# Patient Record
Sex: FEMALE | Employment: FULL TIME | URBAN - METROPOLITAN AREA
[De-identification: names, ages, dates, MRNs, and addresses within clinical notes are randomized per-mention and may not be internally consistent; named-entity substitution may affect disease eponyms.]

---
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
## 2024-03-04 NOTE — PROGRESS NOTES
SUBJECTIVE:                                                   Denice Blanchard is a 18 year old who presents to clinic today for the following health issue(s):  Patient presents with:  Breast Problem      HPI:  Denice reports that she began experiencing breast pain about three years ago and it has occurred intermittently since then. She feels an aching on the outer side of her left breast and has mildly felt it on the right breast on occasion. Denies noticing any lumps, breast changes, or nipple discharge. She is unsure if pain is related to menstrual cycle, but notes that wearing a supportive bra helps. Reports that pain has recently increased in intensity.     She is requesting gc/ct urine screen today.    Patient's last menstrual period was 02/19/2024 (exact date).  Menstrual History: frequency: every 28 days, but last cycle was very heavy and lasted 3 days     Patient is sexually active  No obstetric history on file..  Using oral contraceptives for contraception.   Health maintenance updated:  yes  STI infx testing offered:  Accepted      Problem list and histories reviewed & adjusted, as indicated.  Additional history: as documented.    Patient Active Problem List   Diagnosis    Flexural eczema    Bilateral fibrocystic breast changes     Past Surgical History:   Procedure Laterality Date    NO PAST SURGERIES        Social History     Tobacco Use    Smoking status: Unknown     Passive exposure: Never    Smokeless tobacco: Never   Substance Use Topics    Alcohol use: Yes      Problem (# of Occurrences) Relation (Name,Age of Onset)    Unknown/Adopted (2) Mother, Father              Current Outpatient Medications   Medication Sig    cetirizine (ZYRTEC) 10 MG tablet TAKE 1 TABLET (10 MG) BY MOUTH DAILY (FOR ITCH/HIVES)    desogestrel-ethinyl estradiol (APRI) 0.15-30 MG-MCG tablet Take 1 tablet by mouth daily for 180 days    mometasone (ELOCON) 0.1 % external cream Apply topically daily Daytime as needed     "norethindrone-ethinyl estradiol (JUNEL FE 1/20) 1-20 MG-MCG tablet Take 1 tablet by mouth daily     No current facility-administered medications for this visit.     Allergies   Allergen Reactions    Food      Apples, cherries, peaches       ROS:  12 point review of systems negative other than symptoms noted below.    OBJECTIVE:     /70   Ht 1.549 m (5' 1\")   Wt 53.5 kg (118 lb)   LMP 02/19/2024 (Exact Date)   Breastfeeding No   BMI 22.30 kg/m    Body mass index is 22.3 kg/m .    PHYSICAL EXAM:  Constitutional:  Appearance: Well nourished, well developed alert, in no acute distress  Breasts: Symmetric bilaterally.  No puckering.  No skin changes. No masses present on palpation, no breast tenderness No lymphadenopathy present. Fibrocystic breast tissue palpated bilaterally in upper outer quadrants.  Psychiatric:  Mentation appears normal and affect normal/bright.     In-Clinic Test Results:  No results found for this or any previous visit (from the past 24 hour(s)).    ASSESSMENT/PLAN:                                                        ICD-10-CM    1. Routine screening for STI (sexually transmitted infection)  Z11.3 NEISSERIA GONORRHOEA PCR     CHLAMYDIA TRACHOMATIS PCR     NEISSERIA GONORRHOEA PCR     CHLAMYDIA TRACHOMATIS PCR      2. Breast pain  N64.4 Ob/Gyn  Referral     Breast Provider  Referral     US Breast Bilateral Complete 4 Quadrants     MA Diagnostic Digital Bilateral      3. Fibrocystic breast disease (FCBD), unspecified laterality  N60.19 Breast Provider  Referral     US Breast Bilateral Complete 4 Quadrants     MA Diagnostic Digital Bilateral      4. Encounter for birth control pills maintenance  Z30.41 norethindrone-ethinyl estradiol (JUNEL FE 1/20) 1-20 MG-MCG tablet          COUNSELING:  Discussed that breast pain is likely hormonal and related to fibrocystic breast tissue, which is normal. Reviewed that it's possible SARAH is playing a role, could try lower " does estrogen SARAH but cannot guarantee this would help. Rx sent for lower dose estrogen at patient request. Recommended comfort measures such as supportive bra, reducing caffeine intake, and NSAIDs/heat packs.   Referral for breast center placed with imaging orders for reassurance  GC/CT completed today  RTC as needed if problems arise or for routine well-woman care    ERIC George APRN, CNM

## 2024-03-05 ENCOUNTER — OFFICE VISIT (OUTPATIENT)
Dept: MIDWIFE SERVICES | Facility: CLINIC | Age: 19
End: 2024-03-05
Payer: COMMERCIAL

## 2024-03-05 VITALS
WEIGHT: 118 LBS | SYSTOLIC BLOOD PRESSURE: 120 MMHG | DIASTOLIC BLOOD PRESSURE: 70 MMHG | BODY MASS INDEX: 22.28 KG/M2 | HEIGHT: 61 IN

## 2024-03-05 DIAGNOSIS — N64.4 BREAST PAIN: ICD-10-CM

## 2024-03-05 DIAGNOSIS — Z11.3 ROUTINE SCREENING FOR STI (SEXUALLY TRANSMITTED INFECTION): Primary | ICD-10-CM

## 2024-03-05 DIAGNOSIS — N60.19 FIBROCYSTIC BREAST DISEASE (FCBD), UNSPECIFIED LATERALITY: ICD-10-CM

## 2024-03-05 DIAGNOSIS — Z30.41 ENCOUNTER FOR BIRTH CONTROL PILLS MAINTENANCE: ICD-10-CM

## 2024-03-05 PROCEDURE — 87591 N.GONORRHOEAE DNA AMP PROB: CPT | Performed by: ADVANCED PRACTICE MIDWIFE

## 2024-03-05 PROCEDURE — 99204 OFFICE O/P NEW MOD 45 MIN: CPT | Performed by: ADVANCED PRACTICE MIDWIFE

## 2024-03-05 PROCEDURE — 87491 CHLMYD TRACH DNA AMP PROBE: CPT | Performed by: ADVANCED PRACTICE MIDWIFE

## 2024-03-05 RX ORDER — NORETHINDRONE ACETATE AND ETHINYL ESTRADIOL 1MG-20(21)
1 KIT ORAL DAILY
Qty: 90 TABLET | Refills: 3 | Status: SHIPPED | OUTPATIENT
Start: 2024-03-05

## 2024-03-05 ASSESSMENT — ANXIETY QUESTIONNAIRES
6. BECOMING EASILY ANNOYED OR IRRITABLE: MORE THAN HALF THE DAYS
5. BEING SO RESTLESS THAT IT IS HARD TO SIT STILL: MORE THAN HALF THE DAYS
1. FEELING NERVOUS, ANXIOUS, OR ON EDGE: NEARLY EVERY DAY
IF YOU CHECKED OFF ANY PROBLEMS ON THIS QUESTIONNAIRE, HOW DIFFICULT HAVE THESE PROBLEMS MADE IT FOR YOU TO DO YOUR WORK, TAKE CARE OF THINGS AT HOME, OR GET ALONG WITH OTHER PEOPLE: SOMEWHAT DIFFICULT
GAD7 TOTAL SCORE: 18
GAD7 TOTAL SCORE: 18
2. NOT BEING ABLE TO STOP OR CONTROL WORRYING: NEARLY EVERY DAY
7. FEELING AFRAID AS IF SOMETHING AWFUL MIGHT HAPPEN: NEARLY EVERY DAY
3. WORRYING TOO MUCH ABOUT DIFFERENT THINGS: NEARLY EVERY DAY

## 2024-03-05 ASSESSMENT — PATIENT HEALTH QUESTIONNAIRE - PHQ9
SUM OF ALL RESPONSES TO PHQ QUESTIONS 1-9: 0
5. POOR APPETITE OR OVEREATING: MORE THAN HALF THE DAYS

## 2024-03-06 ENCOUNTER — PATIENT OUTREACH (OUTPATIENT)
Dept: ONCOLOGY | Facility: CLINIC | Age: 19
End: 2024-03-06
Payer: COMMERCIAL

## 2024-03-06 LAB
C TRACH DNA SPEC QL NAA+PROBE: NEGATIVE
N GONORRHOEA DNA SPEC QL NAA+PROBE: NEGATIVE

## 2024-03-06 NOTE — PROGRESS NOTES
New Patient Oncology Nurse Navigator Note     Referring provider: Diamante White CNM     Referring Clinic/Organization: [facility]     Referred to (specialty:) Breast Provider Referral      Date Referral Received: March 5, 2024     Evaluation for:    N64.4 (ICD-10-CM) - Breast pain  N60.19 (ICD-10-CM) - Fibrocystic breast disease (FCBD), unspecified laterality    Clinical History (per Nurse review of records provided):    Per ordering provider 3/5/24 note:  Denice reports that she began experiencing breast pain about three years ago and it has occurred intermittently since then. She feels an aching on the outer side of her left breast and has mildly felt it on the right breast on occasion. Denies noticing any lumps, breast changes, or nipple discharge. She is unsure if pain is related to menstrual cycle, but notes that wearing a supportive bra helps. Reports that pain has recently increased in intensity.   Breasts: Symmetric bilaterally.  No puckering.  No skin changes. No masses present on palpation, no breast tenderness No lymphadenopathy present. Fibrocystic breast tissue palpated bilaterally in upper outer quadrants.  Discussed that breast pain is likely hormonal and related to fibrocystic breast tissue, which is normal. Reviewed that it's possible SARAH is playing a role, could try lower does estrogen SARAH but cannot guarantee this would help. Rx sent for lower dose estrogen at patient request. Recommended comfort measures such as supportive bra, reducing caffeine intake, and NSAIDs/heat packs.   Referral for breast center placed with imaging orders for reassurance    3/6 11:22  - Telephoned and left voice message for patient. In generic manner, explained my role and purpose for the call. She will also be contacted by specialty breast schedulers. Encouraged her to move forward with that scheduling. Welcomed calls.     3/19 9:07 - Telephoned and left voice message for patient  requesting call back and purpose for the call. Writer received referral, reviewed for appropriate plan, and referral transferred to New Patient Scheduling for completion.    Records Location: See Bookmarked material

## 2024-05-01 ENCOUNTER — HOSPITAL ENCOUNTER (OUTPATIENT)
Dept: MAMMOGRAPHY | Facility: CLINIC | Age: 19
Discharge: HOME | End: 2024-05-01
Attending: ADVANCED PRACTICE MIDWIFE
Payer: COMMERCIAL

## 2024-05-01 DIAGNOSIS — N60.19 FIBROCYSTIC BREAST DISEASE (FCBD), UNSPECIFIED LATERALITY: ICD-10-CM

## 2024-05-01 DIAGNOSIS — N64.4 BREAST PAIN: ICD-10-CM

## 2024-05-01 PROCEDURE — 76642 ULTRASOUND BREAST LIMITED: CPT | Mod: LT

## 2024-05-05 DIAGNOSIS — Z30.41 ENCOUNTER FOR SURVEILLANCE OF CONTRACEPTIVE PILLS: ICD-10-CM

## 2024-05-07 RX ORDER — DESOGESTREL AND ETHINYL ESTRADIOL 0.15-0.03
1 KIT ORAL DAILY
Qty: 84 TABLET | Refills: 1 | Status: SHIPPED | OUTPATIENT
Start: 2024-05-07

## 2024-07-03 ENCOUNTER — APPOINTMENT (OUTPATIENT)
Dept: MRI IMAGING | Facility: CLINIC | Age: 19
End: 2024-07-03
Attending: EMERGENCY MEDICINE
Payer: COMMERCIAL

## 2024-07-03 ENCOUNTER — HOSPITAL ENCOUNTER (EMERGENCY)
Facility: CLINIC | Age: 19
Discharge: HOME | End: 2024-07-03
Attending: EMERGENCY MEDICINE | Admitting: EMERGENCY MEDICINE
Payer: COMMERCIAL

## 2024-07-03 VITALS
HEART RATE: 70 BPM | OXYGEN SATURATION: 98 % | DIASTOLIC BLOOD PRESSURE: 70 MMHG | RESPIRATION RATE: 19 BRPM | TEMPERATURE: 98.7 F | SYSTOLIC BLOOD PRESSURE: 100 MMHG

## 2024-07-03 DIAGNOSIS — R20.0 LEFT ARM NUMBNESS: ICD-10-CM

## 2024-07-03 DIAGNOSIS — R20.0 LEFT LEG NUMBNESS: ICD-10-CM

## 2024-07-03 LAB
ALBUMIN SERPL BCG-MCNC: 4.6 G/DL (ref 3.5–5.2)
ALP SERPL-CCNC: 56 U/L (ref 40–150)
ALT SERPL W P-5'-P-CCNC: 20 U/L (ref 0–50)
ANION GAP SERPL CALCULATED.3IONS-SCNC: 11 MMOL/L (ref 7–15)
AST SERPL W P-5'-P-CCNC: 18 U/L (ref 0–45)
ATRIAL RATE - MUSE: 72 BPM
BASOPHILS # BLD AUTO: 0 10E3/UL (ref 0–0.2)
BASOPHILS NFR BLD AUTO: 1 %
BILIRUB SERPL-MCNC: 0.2 MG/DL
BUN SERPL-MCNC: 6.6 MG/DL (ref 6–20)
CALCIUM SERPL-MCNC: 9.9 MG/DL (ref 8.6–10)
CHLORIDE SERPL-SCNC: 105 MMOL/L (ref 98–107)
CREAT SERPL-MCNC: 0.72 MG/DL (ref 0.51–0.95)
DEPRECATED HCO3 PLAS-SCNC: 26 MMOL/L (ref 22–29)
DIASTOLIC BLOOD PRESSURE - MUSE: NORMAL MMHG
EGFRCR SERPLBLD CKD-EPI 2021: >90 ML/MIN/1.73M2
EOSINOPHIL # BLD AUTO: 0.1 10E3/UL (ref 0–0.7)
EOSINOPHIL NFR BLD AUTO: 2 %
ERYTHROCYTE [DISTWIDTH] IN BLOOD BY AUTOMATED COUNT: 13 % (ref 10–15)
GLUCOSE SERPL-MCNC: 93 MG/DL (ref 70–99)
HCG SERPL QL: NEGATIVE
HCT VFR BLD AUTO: 42.4 % (ref 35–47)
HGB BLD-MCNC: 13.9 G/DL (ref 11.7–15.7)
HOLD SPECIMEN: NORMAL
HOLD SPECIMEN: NORMAL
IMM GRANULOCYTES # BLD: 0 10E3/UL
IMM GRANULOCYTES NFR BLD: 0 %
INTERPRETATION ECG - MUSE: NORMAL
LYMPHOCYTES # BLD AUTO: 2 10E3/UL (ref 0.8–5.3)
LYMPHOCYTES NFR BLD AUTO: 32 %
MAGNESIUM SERPL-MCNC: 2.2 MG/DL (ref 1.7–2.3)
MCH RBC QN AUTO: 30 PG (ref 26.5–33)
MCHC RBC AUTO-ENTMCNC: 32.8 G/DL (ref 31.5–36.5)
MCV RBC AUTO: 91 FL (ref 78–100)
MONOCYTES # BLD AUTO: 0.3 10E3/UL (ref 0–1.3)
MONOCYTES NFR BLD AUTO: 5 %
NEUTROPHILS # BLD AUTO: 3.7 10E3/UL (ref 1.6–8.3)
NEUTROPHILS NFR BLD AUTO: 60 %
NRBC # BLD AUTO: 0 10E3/UL
NRBC BLD AUTO-RTO: 0 /100
P AXIS - MUSE: 29 DEGREES
PLATELET # BLD AUTO: 414 10E3/UL (ref 150–450)
POTASSIUM SERPL-SCNC: 3.7 MMOL/L (ref 3.4–5.3)
PR INTERVAL - MUSE: 120 MS
PROT SERPL-MCNC: 8 G/DL (ref 6.4–8.3)
QRS DURATION - MUSE: 64 MS
QT - MUSE: 342 MS
QTC - MUSE: 374 MS
R AXIS - MUSE: 64 DEGREES
RBC # BLD AUTO: 4.64 10E6/UL (ref 3.8–5.2)
SODIUM SERPL-SCNC: 142 MMOL/L (ref 135–145)
SYSTOLIC BLOOD PRESSURE - MUSE: NORMAL MMHG
T AXIS - MUSE: 52 DEGREES
TROPONIN T SERPL HS-MCNC: <6 NG/L
VENTRICULAR RATE- MUSE: 72 BPM
WBC # BLD AUTO: 6.2 10E3/UL (ref 4–11)

## 2024-07-03 PROCEDURE — 85025 COMPLETE CBC W/AUTO DIFF WBC: CPT | Performed by: EMERGENCY MEDICINE

## 2024-07-03 PROCEDURE — 84484 ASSAY OF TROPONIN QUANT: CPT | Performed by: EMERGENCY MEDICINE

## 2024-07-03 PROCEDURE — 72156 MRI NECK SPINE W/O & W/DYE: CPT

## 2024-07-03 PROCEDURE — A9585 GADOBUTROL INJECTION: HCPCS | Performed by: EMERGENCY MEDICINE

## 2024-07-03 PROCEDURE — 93005 ELECTROCARDIOGRAM TRACING: CPT

## 2024-07-03 PROCEDURE — 83735 ASSAY OF MAGNESIUM: CPT | Performed by: EMERGENCY MEDICINE

## 2024-07-03 PROCEDURE — 84703 CHORIONIC GONADOTROPIN ASSAY: CPT | Performed by: EMERGENCY MEDICINE

## 2024-07-03 PROCEDURE — 82565 ASSAY OF CREATININE: CPT | Performed by: EMERGENCY MEDICINE

## 2024-07-03 PROCEDURE — 255N000002 HC RX 255 OP 636: Performed by: EMERGENCY MEDICINE

## 2024-07-03 PROCEDURE — 70553 MRI BRAIN STEM W/O & W/DYE: CPT

## 2024-07-03 PROCEDURE — 36415 COLL VENOUS BLD VENIPUNCTURE: CPT | Performed by: EMERGENCY MEDICINE

## 2024-07-03 PROCEDURE — 99285 EMERGENCY DEPT VISIT HI MDM: CPT | Mod: 25

## 2024-07-03 RX ORDER — GADOBUTROL 604.72 MG/ML
5 INJECTION INTRAVENOUS ONCE
Status: COMPLETED | OUTPATIENT
Start: 2024-07-03 | End: 2024-07-03

## 2024-07-03 RX ADMIN — GADOBUTROL 5 ML: 604.72 INJECTION INTRAVENOUS at 14:04

## 2024-07-03 ASSESSMENT — ACTIVITIES OF DAILY LIVING (ADL)
ADLS_ACUITY_SCORE: 35

## 2024-07-03 NOTE — ED PROVIDER NOTES
Emergency Department Note      History of Present Illness     Chief Complaint   Numbness      HPI   Denice Blanchard is a 19 year old female who presents for sudden, sharp mid-chest pain and left-sided numbness and weakness beginning last night. Denice explains her chest pain lasted a few seconds and immediately was followed by numbness and weakness of her left side, beginning at the top of her left arm radiating to left lower leg. She reports these symptoms were not alleviated overnight and today feels increased weakness when moving her extremities. Currently denies chest pain. Patient states she gets headaches often, but denies any recently. Patient reports a bug bite on her left thigh approximately 1 week ago. She denies any difficulties with walking, dropping objects, trouble balancing, dizziness, neck pain, vision changes, slurred speech, facial drooping, fever, chills, or routine medications. She notes recent changes in birth control and now has about 2 periods a month. Occasional smoker.     Independent Historian   None    Review of External Notes   none    Past Medical History     Medical History and Problem List   History reviewed. No pertinent past medical history.    Medications   Zyrtec    Physical Exam     Patient Vitals for the past 24 hrs:   BP Temp Temp src Pulse Resp SpO2   07/03/24 1130 -- 98.7  F (37.1  C) Temporal -- 17 100 %   07/03/24 1128 (!) 121/90 -- -- 70 -- 97 %     Physical Exam  General: alert, lying comfortably on gurney  HENT: mucous membranes moist  CV: regular rate, regular rhythm, no m/r/g.  2+ radial pulses.  Resp: normal effort, clear throughout, no crackles or wheezing  GI: abdomen soft and nontender, no guarding  MSK: no bony tenderness  Skin: appropriately warm and dry  Extremities: no edema, calves non-tender  Neuro:  Speech is fluent, cognition is normal.    Long and short term memory intact      CN's II-XII intact; EOM intact, symmetric grimace.     UE strength: 5/5  triceps, biceps, ; symmetric bilaterally    LE strength:   5/5 DF, PF, HF, KE; symmetric bilaterally.      Negative Pronator drift.    Normal muscle tone.    Light touch is symmetrical bilateral UE's and LE's.    Reflexes are 2+ and symmetrical.       Finger to Nose and heel to shin testing is intact bilaterally.    Psych: normal mood and affect      Diagnostics     Lab Results   Labs Ordered and Resulted from Time of ED Arrival to Time of ED Departure   MAGNESIUM - Normal       Result Value    Magnesium 2.2     TROPONIN T, HIGH SENSITIVITY - Normal    Troponin T, High Sensitivity <6     COMPREHENSIVE METABOLIC PANEL - Normal    Sodium 142      Potassium 3.7      Carbon Dioxide (CO2) 26      Anion Gap 11      Urea Nitrogen 6.6      Creatinine 0.72      GFR Estimate >90      Calcium 9.9      Chloride 105      Glucose 93      Alkaline Phosphatase 56      AST 18      ALT 20      Protein Total 8.0      Albumin 4.6      Bilirubin Total 0.2     CBC WITH PLATELETS AND DIFFERENTIAL    WBC Count 6.2      RBC Count 4.64      Hemoglobin 13.9      Hematocrit 42.4      MCV 91      MCH 30.0      MCHC 32.8      RDW 13.0      Platelet Count 414      % Neutrophils 60      % Lymphocytes 32      % Monocytes 5      % Eosinophils 2      % Basophils 1      % Immature Granulocytes 0      NRBCs per 100 WBC 0      Absolute Neutrophils 3.7      Absolute Lymphocytes 2.0      Absolute Monocytes 0.3      Absolute Eosinophils 0.1      Absolute Basophils 0.0      Absolute Immature Granulocytes 0.0      Absolute NRBCs 0.0     HCG QUALITATIVE PREGNANCY       Imaging   MR Brain w/o & w Contrast    (Results Pending)   MR Cervical Spine w/o & w Contrast    (Results Pending)       EKG   ECG taken at 1159, ECG read at 1245  Normal sinus rhythm. Normal ECG.   No prior EKG available for comparison.  Rate 72 bpm. OH interval 120 ms. QRS duration 6 ms. QT/QTc 342/374 ms. P-R-T axes 29 64 52.    Independent Interpretation   None    ED Course       Medications Administered   Medications - No data to display    Procedures   Procedures     Discussion of Management   None    ED Course   ED Course as of 07/03/24 1248   Wed Jul 03, 2024   1145 I obtained history and examined the patient as noted above.     1219 I rechecked the patient and performed my exam.        Optional/Additional Documentation  None    Medical Decision Making / Diagnosis     CMS Diagnoses: None    MIPS       None    [facility]   Denice Blanchard is a 19 year old female, otherwise healthy, who presents today with symptoms of chest pain which is resolved, and left-sided numbness.  On exam, the patient has normal vitals.  She is well-appearing, and I cannot reproduce symptoms of weakness or numbness on my exam.  I considered multiple possibilities for symptoms, including multiple sclerosis, transverse myelitis, stroke, venous sinus thrombosis, dissection of the carotid arteries, and aortic dissection.  MRI fortunately is negative for signs of stroke.  I do not suspect aortic dissection, given very brief episode of chest pain, normal blood pressures, symmetric pulses, and low risk based on history.  I do not suspect venous sinus thrombosis, given no concurrent headache or focal neurologic findings.  Labs otherwise are unremarkable.  EKG without signs of ischemia, right heart strain, or signs of pericarditis.  Troponin is negative.  I recommend follow-up with PCP if symptoms continue.  Otherwise, return to the ED for new symptoms such as slurred speech, facial drooping, worsening numbness or weakness, recurrent chest pain, or for any other concerns.    Disposition   The patient was discharged.     Diagnosis     ICD-10-CM    1. Left arm numbness  R20.0       2. Left leg numbness  R20.0            Discharge Medications   New Prescriptions    No medications on file         Scribe Disclosure:  I, Marlyn Cabral, am serving as a scribe at 12:00 PM on 7/3/2024 to document services personally performed by  Mildred Martinez MD based on my observations and the provider's statements to me.        Mildred Martinez MD  07/03/24 0422

## 2024-07-03 NOTE — DISCHARGE INSTRUCTIONS
The cause of your symptoms is unclear.  MRI does not show any signs of stroke, bleeding, or mass.  Your labs are also normal.  There is no sign of a heart attack or other dangerous process today.  I recommend following up with primary care doctor if your symptoms continue.  Return to the ED for worsening symptoms, or for other concerns.    Discharge Instructions  Chest Pain    You have been seen today for chest pain or discomfort.  At this time, your provider has found no signs that your chest pain is due to a serious or life-threatening condition, (or you have declined more testing and/or admission to the hospital). However, sometimes there is a serious problem that does not show up right away. Your evaluation today may not be complete and you may need further testing and evaluation.     Generally, every Emergency Department visit should have a follow-up clinic visit with either a primary or a specialty clinic/provider. Please follow-up as instructed by your emergency provider today.  Return to the Emergency Department if:  Your chest pain changes, gets worse, starts to happen more often, or comes with less activity.  You are newly short of breath.  You get very weak or tired.  You pass out or faint.  You have any new symptoms, like fever, cough, numb legs, or you cough up blood.  You have anything else that worries you.    Until you follow-up with your regular provider, please do the following:  Take one aspirin daily unless you have an allergy or are told not to by your provider.  If a stress test appointment has been made, go to the appointment.  If you have questions, contact your regular provider.  Follow-up with your regular provider/clinic as directed; this is very important.    If you were given a prescription for medicine here today, be sure to read all of the information (including the package insert) that comes with your prescription.  This will include important information about the medicine, its side  effects, and any warnings that you need to know about.  The pharmacist who fills the prescription can provide more information and answer questions you may have about the medicine.  If you have questions or concerns that the pharmacist cannot address, please call or return to the Emergency Department.       Remember that you can always come back to the Emergency Department if you are not able to see your regular provider in the amount of time listed above, if you get any new symptoms, or if there is anything that worries you.

## 2024-07-03 NOTE — ED TRIAGE NOTES
Pt presents to ed to be evaluated for numbness.   Pt states that last night while she was sitting she had a sharp chest pain that lasted until this am, but pt states is gone now. Pt states now she has numbness in her left hand and left foot.   Pt reports she is on birth control, denies any recent leg swelling.      Triage Assessment (Adult)       Row Name 07/03/24 1128          Triage Assessment    Airway WDL WDL        Respiratory WDL    Respiratory WDL WDL        Cardiac WDL    Cardiac WDL WDL        Cognitive/Neuro/Behavioral WDL    Cognitive/Neuro/Behavioral WDL WDL

## 2024-10-08 ENCOUNTER — PATIENT OUTREACH (OUTPATIENT)
Dept: CARE COORDINATION | Facility: CLINIC | Age: 19
End: 2024-10-08
Payer: COMMERCIAL

## 2024-10-22 ENCOUNTER — PATIENT OUTREACH (OUTPATIENT)
Dept: CARE COORDINATION | Facility: CLINIC | Age: 19
End: 2024-10-22
Payer: COMMERCIAL

## 2025-02-17 DIAGNOSIS — Z30.41 ENCOUNTER FOR BIRTH CONTROL PILLS MAINTENANCE: ICD-10-CM

## 2025-02-17 RX ORDER — NORETHINDRONE ACETATE AND ETHINYL ESTRADIOL 1MG-20(21)
1 KIT ORAL DAILY
Qty: 28 TABLET | Refills: 0 | Status: SHIPPED | OUTPATIENT
Start: 2025-02-17

## 2025-02-17 NOTE — TELEPHONE ENCOUNTER
Requested Prescriptions   Pending Prescriptions Disp Refills    norethindrone-ethinyl estradiol (JUNEL FE 1/20) 1-20 MG-MCG tablet 90 tablet 3     Sig: Take 1 tablet by mouth daily.       Contraceptives Protocol Passed - 2/17/2025 11:57 AM        Passed - Patient is not a current smoker if age is 35 or older        Passed - Medication is active on med list and the sig matches. RN to manually verify dose and sig if red X/fail.     If the protocol passes (green check), you do not need to verify med dose and sig.    A prescription matches if they are the same clinical intention.    For Example: once daily and every morning are the same.    For all fails (red x), verify dose and sig.    If the refill does match what is on file, the RN can still proceed to approve the refill request.     If they do not match, route to the appropriate provider.             Passed - Recent (12 mo) or future (90 days) visit within the authorizing provider's specialty     The patient must have completed an in-person or virtual visit within the past 12 months or has a future visit scheduled within the next 90 days with the authorizing provider s specialty.  Urgent care and e-visits do not qualify as an office visit for this protocol.          Passed - Medication indicated for associated diagnosis     Medication is associated with one or more of the following diagnoses:  Contraception  Acne  Dysmenorrhea  Menorrhagia  Amenorrhea  PCOS  Premenstrual Dysphoric Disorder  Irregular menses  Endometriosis  Contraceptive counseling  Finding of menstrual bleeding  Education about oral contraception  Uses contraception  Initial prescription of oral contraception  Oral contraception-no problem  Oral contraceptive repeat          Passed - No active pregnancy on record        Passed - No positive pregnancy test in past 12 months           Last Written Prescription Date:  3/5/24  Last Fill Quantity: 90,  # refills: 3   Last office visit: 3/5/2024 ; last  virtual visit: Visit date not found with prescribing provider:  Diamante White   Future Office Visit:      One month sent  Appointment needed for further refills  Sunita Lopez RN on 2/17/2025 at 3:00 PM

## 2025-03-10 DIAGNOSIS — Z30.41 ENCOUNTER FOR BIRTH CONTROL PILLS MAINTENANCE: ICD-10-CM

## 2025-03-10 RX ORDER — NORETHINDRONE ACETATE AND ETHINYL ESTRADIOL 1MG-20(21)
1 KIT ORAL DAILY
Qty: 28 TABLET | Refills: 0 | OUTPATIENT
Start: 2025-03-10

## 2025-03-10 NOTE — TELEPHONE ENCOUNTER
Requested Prescriptions   Pending Prescriptions Disp Refills    norethindrone-ethinyl estradiol (JUNEL FE 1/20) 1-20 MG-MCG tablet 28 tablet 0     Sig: Take 1 tablet by mouth daily.       Contraceptives Protocol Failed - 3/10/2025 10:17 AM        Failed - Recent (12 mo) or future (90 days) visit within the authorizing provider's specialty     The patient must have completed an in-person or virtual visit within the past 12 months or has a future visit scheduled within the next 90 days with the authorizing provider s specialty.  Urgent care and e-visits do not qualify as an office visit for this protocol.          Passed - Patient is not a current smoker if age is 35 or older        Passed - Medication is active on med list and the sig matches. RN to manually verify dose and sig if red X/fail.     If the protocol passes (green check), you do not need to verify med dose and sig.    A prescription matches if they are the same clinical intention.    For Example: once daily and every morning are the same.    The protocol can not identify upper and lower case letters as matching and will fail.     For Example: Take 1 tablet (50 mg) by mouth daily     TAKE 1 TABLET (50 MG) BY MOUTH DAILY    For all fails (red x), verify dose and sig.    If the refill does match what is on file, the RN can still proceed to approve the refill request.       If they do not match, route to the appropriate provider.             Passed - Medication indicated for associated diagnosis     Medication is associated with one or more of the following diagnoses:  Contraception  Acne  Dysmenorrhea  Menorrhagia  Amenorrhea  PCOS  Premenstrual Dysphoric Disorder  Irregular menses  Endometriosis  Contraceptive counseling  Finding of menstrual bleeding  Education about oral contraception  Uses contraception  Initial prescription of oral contraception  Oral contraception-no problem  Oral contraceptive repeat          Passed - No active pregnancy on record         Passed - No positive pregnancy test in past 12 months            Last Written Prescription Date:  02/17/25  Last Fill Quantity: 28,  # refills: 0   Last office visit: 3/5/2024 ; last virtual visit: Visit date not found with prescribing provider:  03/05/24   Future Office Visit:  none found    Pt due for annual, no appt scheduled. Pt already received one month extension. Rx denied.   Sunita Lopez RN on 3/10/2025 at 10:23 AM